# Patient Record
Sex: FEMALE | URBAN - METROPOLITAN AREA
[De-identification: names, ages, dates, MRNs, and addresses within clinical notes are randomized per-mention and may not be internally consistent; named-entity substitution may affect disease eponyms.]

---

## 2023-01-26 ENCOUNTER — WEB ENCOUNTER (OUTPATIENT)
Dept: URBAN - METROPOLITAN AREA CLINIC 92 | Facility: CLINIC | Age: 48
End: 2023-01-26

## 2023-01-31 ENCOUNTER — CLAIMS CREATED FROM THE CLAIM WINDOW (OUTPATIENT)
Dept: URBAN - METROPOLITAN AREA CLINIC 92 | Facility: CLINIC | Age: 48
End: 2023-01-31
Payer: COMMERCIAL

## 2023-01-31 VITALS
SYSTOLIC BLOOD PRESSURE: 170 MMHG | HEIGHT: 63 IN | BODY MASS INDEX: 45.64 KG/M2 | HEART RATE: 76 BPM | WEIGHT: 257.6 LBS | DIASTOLIC BLOOD PRESSURE: 96 MMHG | TEMPERATURE: 97.6 F

## 2023-01-31 DIAGNOSIS — Z12.11 COLON CANCER SCREENING: ICD-10-CM

## 2023-01-31 PROCEDURE — 99202 OFFICE O/P NEW SF 15 MIN: CPT

## 2023-01-31 RX ORDER — PROPRANOLOL HYDROCHLORIDE 20 MG/5ML
5 ML SOLUTION ORAL ONCE A DAY
Status: ACTIVE | COMMUNITY

## 2023-01-31 RX ORDER — AMLODIPINE BESYLATE 5 MG/1
1 TABLET TABLET ORAL ONCE A DAY
Status: ACTIVE | COMMUNITY

## 2023-01-31 NOTE — HPI-TODAY'S VISIT:
47 year-old female  who presents for a colon cancer screening. referred to us by Dr. Manoj Lopes, copy of note will be sent to referring provider.  Last colonoscopy: never No family history of colon polyps or colon cancer.  She has 1-2 BM daily which are normal. Patient denies abd pain, rectal bleeding, melena, unintentional weight loss, changes in bowel habits and upper gi sx.  Patinet denies blood thinner use, pacemaker/defibrillator, diabetes, kidney disease, and home O2

## 2023-02-16 ENCOUNTER — CLAIMS CREATED FROM THE CLAIM WINDOW (OUTPATIENT)
Dept: URBAN - METROPOLITAN AREA SURGERY CENTER 16 | Facility: SURGERY CENTER | Age: 48
End: 2023-02-16
Payer: COMMERCIAL

## 2023-02-16 ENCOUNTER — CLAIMS CREATED FROM THE CLAIM WINDOW (OUTPATIENT)
Dept: URBAN - METROPOLITAN AREA SURGERY CENTER 16 | Facility: SURGERY CENTER | Age: 48
End: 2023-02-16

## 2023-02-16 DIAGNOSIS — Z12.11 COLON CANCER SCREENING: ICD-10-CM

## 2023-02-16 PROCEDURE — G8907 PT DOC NO EVENTS ON DISCHARG: HCPCS | Performed by: INTERNAL MEDICINE

## 2023-02-16 PROCEDURE — G0121 COLON CA SCRN NOT HI RSK IND: HCPCS | Performed by: INTERNAL MEDICINE

## 2023-02-27 ENCOUNTER — TELEPHONE ENCOUNTER (OUTPATIENT)
Dept: URBAN - METROPOLITAN AREA CLINIC 92 | Facility: CLINIC | Age: 48
End: 2023-02-27

## 2023-02-27 ENCOUNTER — DASHBOARD ENCOUNTERS (OUTPATIENT)
Age: 48
End: 2023-02-27

## 2023-02-28 ENCOUNTER — OFFICE VISIT (OUTPATIENT)
Dept: URBAN - METROPOLITAN AREA CLINIC 92 | Facility: CLINIC | Age: 48
End: 2023-02-28

## 2023-02-28 RX ORDER — AMLODIPINE BESYLATE 5 MG/1
1 TABLET TABLET ORAL ONCE A DAY
COMMUNITY

## 2023-02-28 RX ORDER — PROPRANOLOL HYDROCHLORIDE 20 MG/5ML
5 ML SOLUTION ORAL ONCE A DAY
COMMUNITY